# Patient Record
Sex: MALE | URBAN - METROPOLITAN AREA
[De-identification: names, ages, dates, MRNs, and addresses within clinical notes are randomized per-mention and may not be internally consistent; named-entity substitution may affect disease eponyms.]

---

## 2023-03-16 ENCOUNTER — TELEPHONE (OUTPATIENT)
Dept: NEPHROLOGY | Facility: CLINIC | Age: 83
End: 2023-03-16

## 2023-03-16 NOTE — TELEPHONE ENCOUNTER
----- Message from Shy Howell sent at 3/16/2023  8:42 AM CDT -----  Contact: [phone]  Type: Needs Medical Advice  Who Called:  Az from [facility]     Best Call Back Number: [phone]    Additional Information: Calling to check status of pts referral that was sent in on March 14th.       I called Az back at the [facility]. He said [facility] booked out until Feb 2024. Worsening kidney function GFR 44 03/06/23   GFR on 12/22/22 was 70. He has renal cell carcinoma. Our office is out of their travel range but could make an exception. He has seen a Urologist and has an appt with Oncology. Message sent to my Providers and Managers if and when we would be able to schedule him. I will call Mr. Bernardo back.